# Patient Record
Sex: MALE | Race: WHITE | NOT HISPANIC OR LATINO
[De-identification: names, ages, dates, MRNs, and addresses within clinical notes are randomized per-mention and may not be internally consistent; named-entity substitution may affect disease eponyms.]

---

## 2020-11-05 PROBLEM — Z00.00 ENCOUNTER FOR PREVENTIVE HEALTH EXAMINATION: Status: ACTIVE | Noted: 2020-11-05

## 2020-11-19 ENCOUNTER — LABORATORY RESULT (OUTPATIENT)
Age: 45
End: 2020-11-19

## 2020-11-23 ENCOUNTER — APPOINTMENT (OUTPATIENT)
Dept: PULMONOLOGY | Facility: CLINIC | Age: 45
End: 2020-11-23
Payer: COMMERCIAL

## 2020-11-23 VITALS
TEMPERATURE: 97 F | SYSTOLIC BLOOD PRESSURE: 126 MMHG | HEIGHT: 70 IN | HEART RATE: 86 BPM | OXYGEN SATURATION: 97 % | DIASTOLIC BLOOD PRESSURE: 84 MMHG | BODY MASS INDEX: 29.63 KG/M2 | WEIGHT: 207 LBS

## 2020-11-23 DIAGNOSIS — F17.210 NICOTINE DEPENDENCE, CIGARETTES, UNCOMPLICATED: ICD-10-CM

## 2020-11-23 DIAGNOSIS — Z87.2 PERSONAL HISTORY OF DISEASES OF THE SKIN AND SUBCUTANEOUS TISSUE: ICD-10-CM

## 2020-11-23 DIAGNOSIS — F17.200 NICOTINE DEPENDENCE, UNSPECIFIED, UNCOMPLICATED: ICD-10-CM

## 2020-11-23 DIAGNOSIS — J44.9 CHRONIC OBSTRUCTIVE PULMONARY DISEASE, UNSPECIFIED: ICD-10-CM

## 2020-11-23 DIAGNOSIS — Z23 ENCOUNTER FOR IMMUNIZATION: ICD-10-CM

## 2020-11-23 DIAGNOSIS — Z83.6 FAMILY HISTORY OF OTHER DISEASES OF THE RESPIRATORY SYSTEM: ICD-10-CM

## 2020-11-23 PROCEDURE — ZZZZZ: CPT

## 2020-11-23 PROCEDURE — 95012 NITRIC OXIDE EXP GAS DETER: CPT

## 2020-11-23 PROCEDURE — 99244 OFF/OP CNSLTJ NEW/EST MOD 40: CPT | Mod: 25

## 2020-11-23 PROCEDURE — 90686 IIV4 VACC NO PRSV 0.5 ML IM: CPT

## 2020-11-23 PROCEDURE — 99072 ADDL SUPL MATRL&STAF TM PHE: CPT

## 2020-11-23 PROCEDURE — 94060 EVALUATION OF WHEEZING: CPT

## 2020-11-23 PROCEDURE — 36415 COLL VENOUS BLD VENIPUNCTURE: CPT

## 2020-11-23 PROCEDURE — 71046 X-RAY EXAM CHEST 2 VIEWS: CPT

## 2020-11-23 PROCEDURE — G0008: CPT

## 2020-11-23 PROCEDURE — 99406 BEHAV CHNG SMOKING 3-10 MIN: CPT | Mod: 25

## 2020-11-23 PROCEDURE — 94727 GAS DIL/WSHOT DETER LNG VOL: CPT

## 2020-11-23 PROCEDURE — 94729 DIFFUSING CAPACITY: CPT

## 2020-11-23 RX ORDER — FLUTICASONE PROPIONATE 50 UG/1
50 SPRAY, METERED NASAL
Qty: 16 | Refills: 0 | Status: ACTIVE | COMMUNITY
Start: 2020-10-15

## 2020-11-23 RX ORDER — HYDROCORTISONE 25 MG/G
2.5 OINTMENT TOPICAL
Qty: 20 | Refills: 0 | Status: ACTIVE | COMMUNITY
Start: 2020-08-21

## 2020-11-23 RX ORDER — BUPROPION HYDROCHLORIDE 150 MG/1
150 TABLET, FILM COATED, EXTENDED RELEASE ORAL
Qty: 180 | Refills: 0 | Status: ACTIVE | COMMUNITY
Start: 2020-11-11

## 2020-11-23 RX ORDER — BETAMETHASONE DIPROPIONATE 0.5 MG/G
0.05 OINTMENT TOPICAL
Qty: 45 | Refills: 0 | Status: ACTIVE | COMMUNITY
Start: 2020-10-07

## 2020-11-23 RX ORDER — ALBUTEROL SULFATE 90 UG/1
108 (90 BASE) INHALANT RESPIRATORY (INHALATION)
Qty: 18 | Refills: 0 | Status: ACTIVE | COMMUNITY
Start: 2020-11-19

## 2020-11-23 RX ORDER — BETAMETHASONE DIPROPIONATE 0.5 MG/G
0.05 OINTMENT, AUGMENTED TOPICAL
Qty: 50 | Refills: 0 | Status: ACTIVE | COMMUNITY
Start: 2020-11-13

## 2020-11-23 NOTE — ASSESSMENT
[FreeTextEntry1] : Data reviewed:\par \par PA/lat CXR in office 11/23/2020 : clear\par \par [place] 11/23/2020 : moderate obstruction, FEV1 58% / FENO 16\par PFT in office 11/23/20: mod obstruction w no sig BD response (but 11%, 310cc), normal TLC/DLCO\par \par Impression:\par COPD\par Tobacco dependence\par \par Plan:\par Start Spiriva daily, and continue albuterol prn.\par Check AAT.\par Smoking cessation is critical. After discussion of options, will try Nicotrol inhaler.\par Return 6 weeks.

## 2020-11-23 NOTE — HISTORY OF PRESENT ILLNESS
[TextBox_4] : 11/23/2020: Asked to evaluate patient by Dr Boyd for dyspnea. Smokes 1/2 ppd from 1 ppd x 30 yrs. No other inhalations. Feels dyspneic x 3-4 years. + cough, produces sputum, no blood, + wheezing. No childhood asthma. Does have environmental allergies. [place] Is on albuterol for 1.5 yrs or so, not today. Uses it with relief, but has been trying to wean off. + psoriasis. Has tried wellbutrin to quit. The patch was too intense; gum doesn't help. Tried Chantix remotely, without side effects, can't remember if it helped, but couldn't afford it.\par

## 2020-11-23 NOTE — COUNSELING
[Risk of tobacco use and health benefits of smoking cessation discussed] : Risk of tobacco use and health benefits of smoking cessation discussed [Cessation strategies including cessation program discussed] : Cessation strategies including cessation program discussed [Use of nicotine replacement therapies and other medications discussed] : Use of nicotine replacement therapies and other medications discussed [Willing to Quit Smoking] : Willing to quit smoking [Tobacco Use Cessation Intermediate Greater Than 3 Minutes Up to 10 Minutes] : Tobacco Use Cessation Intermediate Greater Than 3 Minutes Up to 10 Minutes [FreeTextEntry1] : 6

## 2020-11-23 NOTE — CONSULT LETTER
[Dear  ___] : Dear  [unfilled], [( Thank you for referring [unfilled] for consultation for _____ )] : Thank you for referring [unfilled] for consultation for [unfilled] [Please see my note below.] : Please see my note below. [Consult Closing:] : Thank you very much for allowing me to participate in the care of this patient.  If you have any questions, please do not hesitate to contact me. [Sincerely,] : Sincerely, [FreeTextEntry2] : Yoni Boyd, DO\par [address] \[place] [address] [FreeTextEntry3] : Jaimie Daniels MD, FCCP\par

## 2020-11-24 LAB — A1AT SERPL-MCNC: 119 MG/DL

## 2021-02-10 ENCOUNTER — RX RENEWAL (OUTPATIENT)
Age: 46
End: 2021-02-10

## 2021-04-10 ENCOUNTER — LABORATORY RESULT (OUTPATIENT)
Age: 46
End: 2021-04-10

## 2021-04-12 ENCOUNTER — NON-APPOINTMENT (OUTPATIENT)
Age: 46
End: 2021-04-12

## 2021-04-12 ENCOUNTER — APPOINTMENT (OUTPATIENT)
Dept: PULMONOLOGY | Facility: CLINIC | Age: 46
End: 2021-04-12
Payer: COMMERCIAL

## 2021-04-12 VITALS
HEIGHT: 70 IN | DIASTOLIC BLOOD PRESSURE: 82 MMHG | HEART RATE: 98 BPM | WEIGHT: 220 LBS | OXYGEN SATURATION: 96 % | TEMPERATURE: 97.7 F | SYSTOLIC BLOOD PRESSURE: 120 MMHG | BODY MASS INDEX: 31.5 KG/M2

## 2021-04-12 PROCEDURE — 94010 BREATHING CAPACITY TEST: CPT

## 2021-04-12 PROCEDURE — 99214 OFFICE O/P EST MOD 30 MIN: CPT | Mod: 25

## 2021-04-12 PROCEDURE — 99072 ADDL SUPL MATRL&STAF TM PHE: CPT

## 2021-04-12 RX ORDER — TIOTROPIUM BROMIDE INHALATION SPRAY 3.12 UG/1
2.5 SPRAY, METERED RESPIRATORY (INHALATION)
Qty: 1 | Refills: 11 | Status: ACTIVE | COMMUNITY
Start: 2020-11-23 | End: 1900-01-01

## 2021-04-12 RX ORDER — NICOTINE 4 MG/1
10 INHALANT RESPIRATORY (INHALATION)
Qty: 1 | Refills: 2 | Status: ACTIVE | COMMUNITY
Start: 2020-11-23 | End: 1900-01-01

## 2021-04-12 NOTE — ASSESSMENT
[FreeTextEntry1] : Data reviewed:\par \par AAT normal at 119 mg/dl (11/2020)\par \par PA/lat CXR in office 11/23/2020 : clear\par \par [place] 11/23/2020 : moderate obstruction, FEV1 58% / FENO 16\par PFT in office 11/23/20: mod obstruction w no sig BD response (but 11%, 310cc), normal TLC/DLCO\par [place] 4/12/21: mild obstruction, FEV1 78%\par \par Impression:\par COPD\par Tobacco dependence\par \par Plan:\par He has improved on Spiriva and with reduced smoking burden.\par Needs to quit smoking completely. Reviewed options again, continue NIcotrol inhaler.\par Cont Spiriva.\par See me annually. At 48 will discuss LDCT eligibility.

## 2021-04-12 NOTE — HISTORY OF PRESENT ILLNESS
[TextBox_4] : 11/23/2020: Asked to evaluate patient by Dr Boyd for dyspnea. Smokes 1/2 ppd from 1 ppd x 30 yrs. No other inhalations. Feels dyspneic x 3-4 years. + cough, produces sputum, no blood, + wheezing. No childhood asthma. Does have environmental allergies. [place] Is on albuterol for 1.5 yrs or so, not today. Uses it with relief, but has been trying to wean off. + psoriasis. Has tried wellbutrin to quit. The patch was too intense; gum doesn't help. Tried Chantix remotely, without side effects, can't remember if it helped, but couldn't afford it.\par \par 4/12/21: Working really hard on quitting smoking, down to maybe 4-5 cigs a day at most. The Nicotrol inhaler is helpful. Taking Spiriva every day and thinks this helps. Not coughing or wheezing, less dyspneic though still dyspneic riding bike to work. No interval exacerbations or serious health concerns. Had both doses Covid vax.

## 2021-04-12 NOTE — CONSULT LETTER
[Dear  ___] : Dear  [unfilled], [Courtesy Letter:] : I had the pleasure of seeing your patient, [unfilled], in my office today. [Please see my note below.] : Please see my note below. [Consult Closing:] : Thank you very much for allowing me to participate in the care of this patient.  If you have any questions, please do not hesitate to contact me. [Sincerely,] : Sincerely, [FreeTextEntry2] : Yoni Boyd, DO\par [address] \[place] [address] [FreeTextEntry3] : Jaimie Daniels MD, FCCP\par

## 2022-04-11 ENCOUNTER — APPOINTMENT (OUTPATIENT)
Dept: PULMONOLOGY | Facility: CLINIC | Age: 47
End: 2022-04-11

## 2022-05-02 ENCOUNTER — RX RENEWAL (OUTPATIENT)
Age: 47
End: 2022-05-02

## 2022-12-12 ENCOUNTER — NON-APPOINTMENT (OUTPATIENT)
Age: 47
End: 2022-12-12

## 2025-02-22 ENCOUNTER — APPOINTMENT (OUTPATIENT)
Dept: CT IMAGING | Facility: HOSPITAL | Age: 50
End: 2025-02-22